# Patient Record
Sex: FEMALE | Race: WHITE | NOT HISPANIC OR LATINO | Employment: PART TIME | URBAN - METROPOLITAN AREA
[De-identification: names, ages, dates, MRNs, and addresses within clinical notes are randomized per-mention and may not be internally consistent; named-entity substitution may affect disease eponyms.]

---

## 2018-02-23 ENCOUNTER — NON-PROVIDER VISIT (OUTPATIENT)
Dept: CARDIOLOGY | Facility: MEDICAL CENTER | Age: 53
End: 2018-02-23

## 2018-02-23 DIAGNOSIS — R00.2 PALPITATIONS: ICD-10-CM

## 2018-02-23 DIAGNOSIS — I49.1 PREMATURE ATRIAL CONTRACTION: ICD-10-CM

## 2018-02-23 DIAGNOSIS — R00.1 SINUS BRADYCARDIA: ICD-10-CM

## 2018-02-27 DIAGNOSIS — R00.2 PALPITATIONS: ICD-10-CM

## 2018-03-01 LAB — EKG IMPRESSION: NORMAL

## 2018-03-01 PROCEDURE — 93224 XTRNL ECG REC UP TO 48 HRS: CPT | Performed by: INTERNAL MEDICINE

## 2018-03-06 ENCOUNTER — TELEPHONE (OUTPATIENT)
Dept: CARDIOLOGY | Facility: MEDICAL CENTER | Age: 53
End: 2018-03-06

## 2018-03-07 NOTE — TELEPHONE ENCOUNTER
I called the patient at [phone] (M) and left a voicemail regarding:  -To confirm appt with [facility] for 03/15/18 @ 1400.  -To see if I needed to obtain additional records for the visit.  I asked the patient for a call back.    03/08/18 @ 1530-The patient returned by call:  I called the patient at  and talked with the patient regarding:  -I confirmed her appointment with [facility] 03/15/18 @ 1400.  -The patient saw Trever Gilman (Chiropractor/Functional Neurologist), I called their office to obtain consult notes for the referral and get their fax number to send the Holter Monitor results to.   -The patient uses [facility] and will have additional labs drawn on Friday, 03/09/18.  -I will check with [facility] prior to her appointment for recent labs.

## 2018-03-15 ENCOUNTER — OFFICE VISIT (OUTPATIENT)
Dept: CARDIOLOGY | Facility: MEDICAL CENTER | Age: 53
End: 2018-03-15

## 2018-03-15 VITALS
HEIGHT: 67 IN | RESPIRATION RATE: 12 BRPM | WEIGHT: 124 LBS | OXYGEN SATURATION: 98 % | HEART RATE: 58 BPM | SYSTOLIC BLOOD PRESSURE: 108 MMHG | BODY MASS INDEX: 19.46 KG/M2 | DIASTOLIC BLOOD PRESSURE: 70 MMHG

## 2018-03-15 DIAGNOSIS — R00.2 PALPITATIONS: ICD-10-CM

## 2018-03-15 PROCEDURE — 99204 OFFICE O/P NEW MOD 45 MIN: CPT | Performed by: INTERNAL MEDICINE

## 2018-03-15 ASSESSMENT — ENCOUNTER SYMPTOMS
LOSS OF CONSCIOUSNESS: 0
PND: 0
MYALGIAS: 0
PALPITATIONS: 0
ABDOMINAL PAIN: 0
ORTHOPNEA: 0
DIZZINESS: 0
SHORTNESS OF BREATH: 0
CHILLS: 0
FEVER: 0
BLURRED VISION: 0
INSOMNIA: 0

## 2018-03-15 NOTE — PROGRESS NOTES
Subjective:   Olivia Finnegan is a 52 y.o. female who presents today referred by her PCP Dr. Trever Gilman for cardiology consultation for evaluation of palpitations.    The patient has significant past medical history of autoimmune related to hypothyroidism previously on long-term Synthroid, subsequently on a T3/T4 compound for the past 4 years in addition to a recent over-the-counter supplement for her thyroid health when she began developing various symptoms including palpitations.    She was ultimately seen at [facility] on 2/8/2018 and was felt to be dehydrated with a low potassium of 3.3.  She received IV fluids and potassium supplements.    She had a Holter monitor which showed rare ectopy with no sustained episodes of any arrhythmias.    In the meantime or thyroid had been monitored and her thyroid compound and supplement had been discontinued.    During this time her palpitations have abated on she's feeling better.    She has a history of hyper-cholesterolemia but with diet her level has dropped 100 points.  No history of hypertension or diabetes mellitus.  Her father has heart disease but her mother is healthy.    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  Does not smoke cigarettes and only occasionally drinks alcohol.  Reveals a drink a lot of coffee but has eliminated this.    Family history  Father had bypass surgery age 67.    Past Medical History:   Diagnosis Date   • Hyperthyroidism 01/2018   • Hypothyroid      Past Surgical History:   Procedure Laterality Date   • HYSTERECTOMY, VAGINAL       History reviewed. No pertinent family history.  History   Smoking Status   • Former Smoker   • Years: 2.00   • Types: Cigarettes   Smokeless Tobacco   • Never Used     Comment: Light smoker in college, early 1980's.     Allergies   Allergen Reactions   • Celebrex [Celecoxib]      Bad side effects   • Codeine Vomiting     Outpatient Encounter Prescriptions as of 3/15/2018   Medication Sig  "Dispense Refill   • NON SPECIFIED TK 1 C PO QAM AND 1 C QD AT NOON  11   • LYSINE PO Take  by mouth.     • [DISCONTINUED] Cyanocobalamin (VITAMIN B 12 PO) Take  by mouth every day.       No facility-administered encounter medications on file as of 3/15/2018.      Review of Systems   Constitutional: Negative for chills and fever.   HENT: Negative for congestion.    Eyes: Negative for blurred vision.   Respiratory: Negative for shortness of breath.    Cardiovascular: Negative for chest pain, palpitations, orthopnea, leg swelling and PND.   Gastrointestinal: Negative for abdominal pain.   Genitourinary: Negative for dysuria.   Musculoskeletal: Negative for joint pain and myalgias.   Skin: Negative for rash.   Neurological: Negative for dizziness and loss of consciousness.   Psychiatric/Behavioral: The patient does not have insomnia.         Objective:   /70   Pulse (!) 58   Resp 12   Ht 1.702 m (5' 7\")   Wt 56.2 kg (124 lb)   SpO2 98%   BMI 19.42 kg/m²     Physical Exam   Constitutional: She is oriented to person, place, and time. She appears well-nourished. No distress.   HENT:   Head: Normocephalic.   Eyes: Conjunctivae and EOM are normal. Pupils are equal, round, and reactive to light. No scleral icterus.   Neck: No JVD present. Carotid bruit is not present.   Normal jugular venous pressure.   Cardiovascular: Normal rate, regular rhythm, S1 normal and S2 normal.  Exam reveals no gallop and no friction rub.    No murmur heard.  Pulses:       Carotid pulses are 2+ on the right side, and 2+ on the left side.       Radial pulses are 2+ on the right side, and 2+ on the left side.        Femoral pulses are 2+ on the right side, and 2+ on the left side.       Posterior tibial pulses are 2+ on the right side, and 2+ on the left side.   No femoral bruits.   Pulmonary/Chest: Effort normal and breath sounds normal. She has no wheezes. She has no rhonchi. She has no rales.   Abdominal: Soft. Bowel sounds are normal. " She exhibits no abdominal bruit, no pulsatile midline mass and no mass. There is no hepatosplenomegaly. There is no tenderness.   Musculoskeletal: She exhibits no edema.   Neurological: She is alert and oriented to person, place, and time. She has normal strength. Gait normal.   Skin: Skin is warm and dry. No cyanosis. Nails show no clubbing.   Psychiatric: She has a normal mood and affect. Her behavior is normal.     EKGs, laboratory results and Holter monitor reviewed.    Assessment:     1. Palpitations         Medical Decision Making:  Today's Assessment / Status / Plan:     The patient's current cardiovascular examination is normal.  Her palpitations have resolved with some adjustments in her thyroid supplements which is now being closely monitored and thus far is continued to be normal off of her thyroid supplements.  Holter monitor is unremarkable.  She has no other cardiac symptoms at work further evaluation.  It may be worthwhile to follow up on her potassium level.  In the event there is any additional concerns regarding her cardiac condition please don't hesitate to contact me.    Thank you for allowing me to see this lady in consultation.

## 2018-03-15 NOTE — LETTER
[facility]   [address], [address]  [place] [address]  Phone: [phone]  Fax: [phone]              Olivia Finnegan  1965    Encounter Date: 3/15/2018    Jim Rodriguez M.D.          PROGRESS NOTE:  Subjective:   Olivia Finnegan is a 52 y.o. female who presents today referred by her PCP Dr. Trever Gilman for cardiology consultation for evaluation of palpitations.    The patient has significant past medical history of autoimmune related to hypothyroidism previously on long-term Synthroid, subsequently on a T3/T4 compound for the past 4 years in addition to a recent over-the-counter supplement for her thyroid health when she began developing various symptoms including palpitations.    She was ultimately seen at [facility]note 3[facility] on 2/8/2018 and was felt to be dehydrated with a low potassium of 3.3.  She received IV fluids and potassium supplements.    She had a Holter monitor which showed rare ectopy with no sustained episodes of any arrhythmias.    In the meantime or thyroid had been monitored and her thyroid compound and supplement had been discontinued.    During this time her palpitations have abated on she's feeling better.    She has a history of hyper-cholesterolemia but with diet her level has dropped 100 points.  No history of hypertension or diabetes mellitus.  Her father has heart disease but her mother is healthy.    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  Does not smoke cigarettes and only occasionally drinks alcohol.  Reveals a drink a lot of coffee but has eliminated this.    Family history  Father had bypass surgery age 67.    Past Medical History:   Diagnosis Date   • Hyperthyroidism 01/2018   • Hypothyroid      Past Surgical History:   Procedure Laterality Date   • HYSTERECTOMY, VAGINAL       History reviewed. No pertinent family history.  History   Smoking Status   • Former Smoker   • Years: 2.00   • Types:  "Cigarettes   Smokeless Tobacco   • Never Used     Comment: Light smoker in college, early 1980's.     Allergies   Allergen Reactions   • Celebrex [Celecoxib]      Bad side effects   • Codeine Vomiting     Outpatient Encounter Prescriptions as of 3/15/2018   Medication Sig Dispense Refill   • NON SPECIFIED TK 1 C PO QAM AND 1 C QD AT NOON  11   • LYSINE PO Take  by mouth.     • [DISCONTINUED] Cyanocobalamin (VITAMIN B 12 PO) Take  by mouth every day.       No facility-administered encounter medications on file as of 3/15/2018.      Review of Systems   Constitutional: Negative for chills and fever.   HENT: Negative for congestion.    Eyes: Negative for blurred vision.   Respiratory: Negative for shortness of breath.    Cardiovascular: Negative for chest pain, palpitations, orthopnea, leg swelling and PND.   Gastrointestinal: Negative for abdominal pain.   Genitourinary: Negative for dysuria.   Musculoskeletal: Negative for joint pain and myalgias.   Skin: Negative for rash.   Neurological: Negative for dizziness and loss of consciousness.   Psychiatric/Behavioral: The patient does not have insomnia.         Objective:   /70   Pulse (!) 58   Resp 12   Ht 1.702 m (5' 7\")   Wt 56.2 kg (124 lb)   SpO2 98%   BMI 19.42 kg/m²      Physical Exam   Constitutional: She is oriented to person, place, and time. She appears well-nourished. No distress.   HENT:   Head: Normocephalic.   Eyes: Conjunctivae and EOM are normal. Pupils are equal, round, and reactive to light. No scleral icterus.   Neck: No JVD present. Carotid bruit is not present.   Normal jugular venous pressure.   Cardiovascular: Normal rate, regular rhythm, S1 normal and S2 normal.  Exam reveals no gallop and no friction rub.    No murmur heard.  Pulses:       Carotid pulses are 2+ on the right side, and 2+ on the left side.       Radial pulses are 2+ on the right side, and 2+ on the left side.        Femoral pulses are 2+ on the right side, and 2+ on the " left side.       Posterior tibial pulses are 2+ on the right side, and 2+ on the left side.   No femoral bruits.   Pulmonary/Chest: Effort normal and breath sounds normal. She has no wheezes. She has no rhonchi. She has no rales.   Abdominal: Soft. Bowel sounds are normal. She exhibits no abdominal bruit, no pulsatile midline mass and no mass. There is no hepatosplenomegaly. There is no tenderness.   Musculoskeletal: She exhibits no edema.   Neurological: She is alert and oriented to person, place, and time. She has normal strength. Gait normal.   Skin: Skin is warm and dry. No cyanosis. Nails show no clubbing.   Psychiatric: She has a normal mood and affect. Her behavior is normal.     EKGs, laboratory results and Holter monitor reviewed.    Assessment:     1. Palpitations         Medical Decision Making:  Today's Assessment / Status / Plan:     The patient's current cardiovascular examination is normal.  Her palpitations have resolved with some adjustments in her thyroid supplements which is now being closely monitored and thus far is continued to be normal off of her thyroid supplements.  Holter monitor is unremarkable.  She has no other cardiac symptoms at work further evaluation.  It may be worthwhile to follow up on her potassium level.  In the event there is any additional concerns regarding her cardiac condition please don't hesitate to contact me.    Thank you for allowing me to see this lady in consultation.      Trever Gilman D.C.  [address]  [place] [address]  VIA Facsimile: [phone]

## 2019-03-08 ENCOUNTER — NON-PROVIDER VISIT (OUTPATIENT)
Dept: CARDIOLOGY | Facility: CLINIC | Age: 54
End: 2019-03-08

## 2019-03-08 DIAGNOSIS — R00.1 BRADYCARDIA: ICD-10-CM

## 2019-03-08 DIAGNOSIS — R00.2 PALPITATIONS: ICD-10-CM

## 2019-03-08 PROCEDURE — 93224 XTRNL ECG REC UP TO 48 HRS: CPT | Performed by: INTERNAL MEDICINE

## 2019-03-13 ENCOUNTER — TELEPHONE (OUTPATIENT)
Dept: CARDIOLOGY | Facility: MEDICAL CENTER | Age: 54
End: 2019-03-13

## 2019-03-13 DIAGNOSIS — R00.2 PALPITATIONS: ICD-10-CM

## 2019-03-13 LAB — EKG IMPRESSION: NORMAL

## 2019-03-13 NOTE — TELEPHONE ENCOUNTER
Holter Monitor Study   Order: [id number]   Status:  Final result   Visible to patient:  Yes (MyChart) Dx:  Palpitations   Notes recorded by Isaura Tran R.N. on 3/13/2019 at 4:38 PM PDT  Pt called and notified. Advised to decrease caffeine, etoh, or chocolate if aggravate, no Decongestants, not life threatening. Benign. No need to trat unless very bothersome. Pt verbalized understanding.  ------    Notes recorded by Alberto Capps M.D. on 3/13/2019 at 3:38 PM PDT  The holter test looks good her palpitations correlated with early heartbeats which overall is benign, please let her know     Thank you    ------    Notes recorded by Vidhi Lopez R.N. on 3/13/2019 at 11:03 AM PDT  No FV scheduled, thank you!

## 2019-03-13 NOTE — TELEPHONE ENCOUNTER
need holter order   Received: Today Message Contents   Tori Pérez, Med Ass't  Vidhi Lopez, R.N.          No ADD 3/13/19   To ABHINAV Gilman DO ordered 48 hour holter, dx: palps      Holter Monitor ordered.